# Patient Record
Sex: MALE | Race: BLACK OR AFRICAN AMERICAN | NOT HISPANIC OR LATINO | Employment: FULL TIME | URBAN - METROPOLITAN AREA
[De-identification: names, ages, dates, MRNs, and addresses within clinical notes are randomized per-mention and may not be internally consistent; named-entity substitution may affect disease eponyms.]

---

## 2019-07-03 PROBLEM — Z72.0 TOBACCO CHEW USE: Status: ACTIVE | Noted: 2019-07-03

## 2019-08-07 PROBLEM — N17.9 AKI (ACUTE KIDNEY INJURY): Status: ACTIVE | Noted: 2019-08-07

## 2020-01-27 PROBLEM — E29.1 SECONDARY MALE HYPOGONADISM: Status: ACTIVE | Noted: 2020-01-27

## 2020-01-27 PROBLEM — E03.8 SECONDARY HYPOTHYROIDISM: Status: ACTIVE | Noted: 2020-01-27

## 2020-01-27 PROBLEM — D35.2 PITUITARY ADENOMA: Status: ACTIVE | Noted: 2020-01-27

## 2020-03-02 PROBLEM — M25.561 CHRONIC PAIN OF BOTH KNEES: Status: ACTIVE | Noted: 2020-03-02

## 2020-03-02 PROBLEM — M25.562 CHRONIC PAIN OF BOTH KNEES: Status: ACTIVE | Noted: 2020-03-02

## 2020-03-02 PROBLEM — G89.29 CHRONIC PAIN OF BOTH KNEES: Status: ACTIVE | Noted: 2020-03-02

## 2020-03-02 PROBLEM — Z00.00 HEALTHCARE MAINTENANCE: Status: ACTIVE | Noted: 2020-03-02

## 2020-06-01 PROBLEM — Z00.00 HEALTHCARE MAINTENANCE: Status: RESOLVED | Noted: 2020-03-02 | Resolved: 2020-06-01

## 2022-02-07 PROBLEM — E89.3 HYPOPITUITARISM AFTER ADENOMA RESECTION: Status: ACTIVE | Noted: 2022-02-07

## 2024-01-25 ENCOUNTER — PATIENT MESSAGE (OUTPATIENT)
Dept: ADMINISTRATIVE | Facility: HOSPITAL | Age: 54
End: 2024-01-25

## 2024-01-25 ENCOUNTER — PATIENT OUTREACH (OUTPATIENT)
Dept: ADMINISTRATIVE | Facility: HOSPITAL | Age: 54
End: 2024-01-25

## 2025-06-09 ENCOUNTER — PATIENT OUTREACH (OUTPATIENT)
Dept: ADMINISTRATIVE | Facility: OTHER | Age: 55
End: 2025-06-09

## 2025-06-09 NOTE — PROGRESS NOTES
CHW - Case Closure    This Community Health Worker spoke to patient via telephone today.   Pt/Caregiver reported: CHW spoke with the patient regarding a referral by Dr. Yoly Lake for insurance. The patient states he had insurance with [employer] but decided [facility] go back [facility] [employer] [facility] The SDOH addressed and no resources needed at this time.  Pt/Caregiver denied any additional needs at this time and agrees with episode closure at this time.  Provided patient with Community Health Worker's contact information and encouraged him/her to contact this Community Health Worker if additional needs arise.

## 2025-06-09 NOTE — PROGRESS NOTES
CHW - Initial Contact    This Community Health Worker completed OR updated the Social Determinant of Health questionnaire with patient via telephone today.    Pt identified barriers of most importance are: insurance but the patient is insured as of 6/9/25.   Support and Services: no assistance needed at this time.  Other information discussed the patient needs / wants help with: CHW spoke with the patient regarding a referral by Dr. Yoly Lake for insurance. The patient states he had insurance with [employer] but [facility] [employer] [facility] The SDOH addressed and no resources needed at this time.  Follow up required: no  No future outreach task assigned